# Patient Record
Sex: MALE | Race: WHITE | NOT HISPANIC OR LATINO | Employment: FULL TIME | URBAN - METROPOLITAN AREA
[De-identification: names, ages, dates, MRNs, and addresses within clinical notes are randomized per-mention and may not be internally consistent; named-entity substitution may affect disease eponyms.]

---

## 2017-04-25 ENCOUNTER — HOSPITAL ENCOUNTER (EMERGENCY)
Facility: HOSPITAL | Age: 57
Discharge: HOME | End: 2017-04-25
Attending: EMERGENCY MEDICINE | Admitting: EMERGENCY MEDICINE

## 2017-04-25 VITALS
HEART RATE: 102 BPM | SYSTOLIC BLOOD PRESSURE: 140 MMHG | RESPIRATION RATE: 18 BRPM | OXYGEN SATURATION: 99 % | WEIGHT: 185.2 LBS | TEMPERATURE: 98.3 F | DIASTOLIC BLOOD PRESSURE: 76 MMHG

## 2017-04-25 DIAGNOSIS — IMO0001 ALCOHOLISM /ALCOHOL ABUSE: Primary | ICD-10-CM

## 2017-04-25 PROCEDURE — 99284 EMERGENCY DEPT VISIT MOD MDM: CPT

## 2017-04-25 PROCEDURE — 93005 ELECTROCARDIOGRAM TRACING: CPT

## 2017-04-25 RX ORDER — LORAZEPAM 1 MG/1
1 TABLET ORAL ONCE
Status: COMPLETED | OUTPATIENT
Start: 2017-04-25 | End: 2017-04-25

## 2017-04-25 RX ORDER — LORAZEPAM 1 MG/1
0.5 TABLET ORAL EVERY 6 HOURS PRN
Qty: 15 TABLET | Refills: 0 | Status: SHIPPED | OUTPATIENT
Start: 2017-04-25 | End: 2018-05-07

## 2017-04-25 RX ADMIN — LORAZEPAM 1 MG: 1 TABLET ORAL at 18:23

## 2017-04-26 LAB
ATRIAL RATE: 121 BPM
P AXIS: 47 DEGREES
PR INTERVAL: 136 MS
QRS AXIS: 14 DEGREES
QRSD INTERVAL: 80 MS
QT INTERVAL: 342 MS
QTC INTERVAL: 485 MS
T WAVE AXIS: 86 DEGREES
VENTRICULAR RATE: 121 BPM

## 2018-05-07 ENCOUNTER — APPOINTMENT (EMERGENCY)
Dept: RADIOLOGY | Facility: HOSPITAL | Age: 58
End: 2018-05-07
Payer: COMMERCIAL

## 2018-05-07 ENCOUNTER — HOSPITAL ENCOUNTER (EMERGENCY)
Facility: HOSPITAL | Age: 58
Discharge: HOME | End: 2018-05-07
Attending: EMERGENCY MEDICINE
Payer: COMMERCIAL

## 2018-05-07 VITALS
TEMPERATURE: 98.3 F | RESPIRATION RATE: 17 BRPM | DIASTOLIC BLOOD PRESSURE: 93 MMHG | HEART RATE: 97 BPM | OXYGEN SATURATION: 97 % | SYSTOLIC BLOOD PRESSURE: 122 MMHG

## 2018-05-07 DIAGNOSIS — M25.512 LEFT SHOULDER PAIN: Primary | ICD-10-CM

## 2018-05-07 PROCEDURE — 99283 EMERGENCY DEPT VISIT LOW MDM: CPT

## 2018-05-07 PROCEDURE — 73030 X-RAY EXAM OF SHOULDER: CPT

## 2018-05-07 RX ORDER — NAPROXEN 500 MG/1
500 TABLET ORAL 2 TIMES DAILY WITH MEALS
Qty: 30 TABLET | Refills: 0 | Status: SHIPPED | OUTPATIENT
Start: 2018-05-07 | End: 2018-10-23

## 2018-05-07 NOTE — ED NOTES
Pt may be a poor historian d/t appearing to be intoxicated  Admits to "taking a shot of vodka" prior to arrival and smells of alcohol       Salas Man RN  05/07/18 6808
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
## 2018-05-07 NOTE — DISCHARGE INSTRUCTIONS
Shoulder Pain   WHAT YOU NEED TO KNOW:   Shoulder pain is a common problem and can affect your daily activities  Pain can be caused by a problem within your shoulder  Shoulder pain may also be caused by pain that spreads to your shoulder from another part of your body  DISCHARGE INSTRUCTIONS:   Return to the emergency department if:   · You have severe pain  · You cannot move your arm or shoulder  · You have numbness or tingling in your shoulder or arm  Contact your healthcare provider if:   · Your pain gets worse or does not go away with treatment  · You have trouble moving your arm or shoulder  · You have questions or concerns about your condition or care  Medicines: You may need any of the following:  · Acetaminophen  decreases pain and fever  It is available without a doctor's order  Ask how much to take and how often to take it  Follow directions  Acetaminophen can cause liver damage if not taken correctly  · NSAIDs , such as ibuprofen, help decrease swelling, pain, and fever  This medicine is available with or without a doctor's order  NSAIDs can cause stomach bleeding or kidney problems in certain people  If you take blood thinner medicine, always ask your healthcare provider if NSAIDs are safe for you  Always read the medicine label and follow directions  · Take your medicine as directed  Contact your healthcare provider if you think your medicine is not helping or if you have side effects  Tell him of her if you are allergic to any medicine  Keep a list of the medicines, vitamins, and herbs you take  Include the amounts, and when and why you take them  Bring the list or the pill bottles to follow-up visits  Carry your medicine list with you in case of an emergency  Follow up with your healthcare provider or orthopedist as directed:  Write down your questions so you remember to ask them during your visits     Manage your symptoms:   · Apply ice  on your shoulder for 20 to 30 minutes every 2 hours or as directed  Use an ice pack, or put crushed ice in a plastic bag  Cover it with a towel  Ice helps prevent tissue damage and decreases swelling and pain  · Apply heat if ice does not help your symptoms  Apply heat on your shoulder for 20 to 30 minutes every 2 hours for as many days as directed  Heat helps decrease pain and muscle spasms  · Go to physical or occupational therapy as directed  A physical therapist teaches you exercises to help improve movement and strength, and to decrease pain  An occupational therapist teaches you skills to help with your daily activities  Prevent shoulder pain:   · Stretch and strengthen your shoulder  Use proper technique during exercises and sports  · Limit activities as directed  Try to avoid repeated overhead movements  © 2017 [facility] Information is for End User's use only and may not be sold, redistributed or otherwise used for commercial purposes  All illustrations and images included in CareNotes® are the copyrighted property of A D A M , Inc  or Sheldon Iraheta  The above information is an  only  It is not intended as medical advice for individual conditions or treatments  Talk to your doctor, nurse or pharmacist before following any medical regimen to see if it is safe and effective for you  DISCHARGE INSTRUCTIONS:    FOLLOW UP WITH YOUR PRIMARY CARE PROVIDER OR THE [address] [place] [address]  MAKE AN APPOINTMENT TO BE SEEN  TAKE NAPROXEN AS PRESCRIBED FOR PAIN AND INFLAMMATION  IF RASH, SHORTNESS OF BREATH OR TROUBLE SWALLOWING, STOP TAKING THE MEDICATION AND BE SEEN  REST AND ICE THE AREA  FOLLOW UP WITH THE RECOMMENDED ORTHOPEDIC SPECIALIST  IF SYMPTOMS WORSEN OR NEW SYMPTOMS ARISE, RETURN TO THE ER TO BE SEEN

## 2018-10-22 ENCOUNTER — HOSPITAL ENCOUNTER (EMERGENCY)
Facility: HOSPITAL | Age: 58
Discharge: HOME | End: 2018-10-23
Attending: EMERGENCY MEDICINE | Admitting: EMERGENCY MEDICINE
Payer: COMMERCIAL

## 2018-10-22 ENCOUNTER — APPOINTMENT (EMERGENCY)
Dept: RADIOLOGY | Facility: HOSPITAL | Age: 58
End: 2018-10-22
Payer: COMMERCIAL

## 2018-10-22 DIAGNOSIS — F10.929 ALCOHOL INTOXICATION (HCC): Primary | ICD-10-CM

## 2018-10-22 DIAGNOSIS — R07.9 CHEST PAIN: ICD-10-CM

## 2018-10-22 LAB
ALBUMIN SERPL BCP-MCNC: 4.2 G/DL (ref 3.5–5)
ALP SERPL-CCNC: 98 U/L (ref 46–116)
ALT SERPL W P-5'-P-CCNC: 31 U/L (ref 12–78)
ANION GAP SERPL CALCULATED.3IONS-SCNC: 15 MMOL/L (ref 4–13)
APTT PPP: 30 SECONDS (ref 24–36)
AST SERPL W P-5'-P-CCNC: 26 U/L (ref 5–45)
BASOPHILS # BLD AUTO: 0.03 THOUSANDS/ΜL (ref 0–0.1)
BASOPHILS NFR BLD AUTO: 0 % (ref 0–1)
BILIRUB SERPL-MCNC: 0.36 MG/DL (ref 0.2–1)
BUN SERPL-MCNC: 9 MG/DL (ref 5–25)
CALCIUM SERPL-MCNC: 9 MG/DL (ref 8.3–10.1)
CHLORIDE SERPL-SCNC: 102 MMOL/L (ref 100–108)
CO2 SERPL-SCNC: 24 MMOL/L (ref 21–32)
CREAT SERPL-MCNC: 0.76 MG/DL (ref 0.6–1.3)
EOSINOPHIL # BLD AUTO: 0.03 THOUSAND/ΜL (ref 0–0.61)
EOSINOPHIL NFR BLD AUTO: 0 % (ref 0–6)
ERYTHROCYTE [DISTWIDTH] IN BLOOD BY AUTOMATED COUNT: 15.3 % (ref 11.6–15.1)
ETHANOL EXG-MCNC: 0.1 MG/DL
ETHANOL SERPL-MCNC: 207 MG/DL (ref 0–3)
GFR SERPL CREATININE-BSD FRML MDRD: 101 ML/MIN/1.73SQ M
GLUCOSE SERPL-MCNC: 98 MG/DL (ref 65–140)
HCT VFR BLD AUTO: 38 % (ref 36.5–49.3)
HGB BLD-MCNC: 12.9 G/DL (ref 12–17)
IMM GRANULOCYTES # BLD AUTO: 0.01 THOUSAND/UL (ref 0–0.2)
IMM GRANULOCYTES NFR BLD AUTO: 0 % (ref 0–2)
INR PPP: 1.02 (ref 0.86–1.17)
LIPASE SERPL-CCNC: 131 U/L (ref 73–393)
LYMPHOCYTES # BLD AUTO: 3.05 THOUSANDS/ΜL (ref 0.6–4.47)
LYMPHOCYTES NFR BLD AUTO: 40 % (ref 14–44)
MCH RBC QN AUTO: 29.9 PG (ref 26.8–34.3)
MCHC RBC AUTO-ENTMCNC: 33.9 G/DL (ref 31.4–37.4)
MCV RBC AUTO: 88 FL (ref 82–98)
MONOCYTES # BLD AUTO: 0.63 THOUSAND/ΜL (ref 0.17–1.22)
MONOCYTES NFR BLD AUTO: 8 % (ref 4–12)
NEUTROPHILS # BLD AUTO: 3.85 THOUSANDS/ΜL (ref 1.85–7.62)
NEUTS SEG NFR BLD AUTO: 52 % (ref 43–75)
NRBC BLD AUTO-RTO: 0 /100 WBCS
PLATELET # BLD AUTO: 301 THOUSANDS/UL (ref 149–390)
PMV BLD AUTO: 10 FL (ref 8.9–12.7)
POTASSIUM SERPL-SCNC: 3.8 MMOL/L (ref 3.5–5.3)
PROT SERPL-MCNC: 7.9 G/DL (ref 6.4–8.2)
PROTHROMBIN TIME: 13.5 SECONDS (ref 11.8–14.2)
RBC # BLD AUTO: 4.31 MILLION/UL (ref 3.88–5.62)
SODIUM SERPL-SCNC: 141 MMOL/L (ref 136–145)
TROPONIN I SERPL-MCNC: <0.02 NG/ML
WBC # BLD AUTO: 7.6 THOUSAND/UL (ref 4.31–10.16)

## 2018-10-22 PROCEDURE — 84484 ASSAY OF TROPONIN QUANT: CPT | Performed by: EMERGENCY MEDICINE

## 2018-10-22 PROCEDURE — 80053 COMPREHEN METABOLIC PANEL: CPT | Performed by: EMERGENCY MEDICINE

## 2018-10-22 PROCEDURE — 83690 ASSAY OF LIPASE: CPT | Performed by: EMERGENCY MEDICINE

## 2018-10-22 PROCEDURE — 80320 DRUG SCREEN QUANTALCOHOLS: CPT | Performed by: EMERGENCY MEDICINE

## 2018-10-22 PROCEDURE — 82075 ASSAY OF BREATH ETHANOL: CPT | Performed by: EMERGENCY MEDICINE

## 2018-10-22 PROCEDURE — 99285 EMERGENCY DEPT VISIT HI MDM: CPT

## 2018-10-22 PROCEDURE — 85025 COMPLETE CBC W/AUTO DIFF WBC: CPT | Performed by: EMERGENCY MEDICINE

## 2018-10-22 PROCEDURE — 96365 THER/PROPH/DIAG IV INF INIT: CPT

## 2018-10-22 PROCEDURE — 85730 THROMBOPLASTIN TIME PARTIAL: CPT | Performed by: EMERGENCY MEDICINE

## 2018-10-22 PROCEDURE — 71046 X-RAY EXAM CHEST 2 VIEWS: CPT

## 2018-10-22 PROCEDURE — 93005 ELECTROCARDIOGRAM TRACING: CPT

## 2018-10-22 PROCEDURE — 85610 PROTHROMBIN TIME: CPT | Performed by: EMERGENCY MEDICINE

## 2018-10-22 PROCEDURE — 36415 COLL VENOUS BLD VENIPUNCTURE: CPT | Performed by: EMERGENCY MEDICINE

## 2018-10-22 PROCEDURE — 96375 TX/PRO/DX INJ NEW DRUG ADDON: CPT

## 2018-10-22 RX ORDER — ONDANSETRON 2 MG/ML
4 INJECTION INTRAMUSCULAR; INTRAVENOUS ONCE
Status: COMPLETED | OUTPATIENT
Start: 2018-10-22 | End: 2018-10-22

## 2018-10-22 RX ORDER — ASPIRIN 81 MG/1
324 TABLET, CHEWABLE ORAL ONCE
Status: COMPLETED | OUTPATIENT
Start: 2018-10-22 | End: 2018-10-22

## 2018-10-22 RX ADMIN — SODIUM CHLORIDE 1000 ML: 0.9 INJECTION, SOLUTION INTRAVENOUS at 21:24

## 2018-10-22 RX ADMIN — FOLIC ACID 1 MG: 5 INJECTION, SOLUTION INTRAMUSCULAR; INTRAVENOUS; SUBCUTANEOUS at 21:24

## 2018-10-22 RX ADMIN — ONDANSETRON 4 MG: 2 INJECTION INTRAMUSCULAR; INTRAVENOUS at 21:36

## 2018-10-22 RX ADMIN — ASPIRIN 81 MG 324 MG: 81 TABLET ORAL at 23:33

## 2018-10-23 VITALS
RESPIRATION RATE: 16 BRPM | TEMPERATURE: 99.1 F | HEART RATE: 85 BPM | DIASTOLIC BLOOD PRESSURE: 77 MMHG | SYSTOLIC BLOOD PRESSURE: 136 MMHG | WEIGHT: 160 LBS | OXYGEN SATURATION: 100 %

## 2018-10-23 LAB
ATRIAL RATE: 92 BPM
ATRIAL RATE: 94 BPM
P AXIS: 58 DEGREES
P AXIS: 59 DEGREES
PR INTERVAL: 156 MS
PR INTERVAL: 162 MS
QRS AXIS: 23 DEGREES
QRS AXIS: 31 DEGREES
QRSD INTERVAL: 82 MS
QRSD INTERVAL: 84 MS
QT INTERVAL: 334 MS
QT INTERVAL: 336 MS
QTC INTERVAL: 413 MS
QTC INTERVAL: 420 MS
T WAVE AXIS: 39 DEGREES
T WAVE AXIS: 45 DEGREES
TROPONIN I SERPL-MCNC: <0.02 NG/ML
VENTRICULAR RATE: 92 BPM
VENTRICULAR RATE: 94 BPM

## 2018-10-23 PROCEDURE — 93010 ELECTROCARDIOGRAM REPORT: CPT | Performed by: INTERNAL MEDICINE

## 2018-10-23 NOTE — ED NOTES
Water, apple juice and minh crackers given to patient   Waiting for HOST to come pt aware and verbalizes understanding       David Post RN  10/23/18 0644

## 2018-10-23 NOTE — DISCHARGE INSTRUCTIONS
Follow up with HOST as you need to  If you feel you cannot stay sober so can return to the ER  Return to the ER if your chest pain changes in quality or location, or becomes associated with shortness of breath, lightheadedness, vomiting or sweating  Alcohol Intoxication   WHAT YOU NEED TO KNOW:   Alcohol intoxication is a harmful physical condition caused when you drink more alcohol than your body can handle  It is also called ethanol poisoning, or being drunk  DISCHARGE INSTRUCTIONS:   Medicine: You may be given medicine to manage the signs and symptoms of alcohol intoxication  Take your medicine as directed  Contact your healthcare provider if you think your medicine is not helping or if you have side effects  Tell him if you are allergic to any medicine  Keep a list of the medicines, vitamins, and herbs you take  Include the amounts, and when and why you take them  Bring the list or the pill bottles to follow-up visits  Keep the list with you in case of emergency  Follow up with your healthcare provider as directed:  Write down your questions so you remember to ask them during your visits  Limit or avoid alcohol:  Men should not have more than 2 drinks per day  Women should not have more than 1 drink per day  A drink is 12 ounces of beer, 5 ounces of wine, or 1½ ounces of liquor  Do not drive or operate machines when you drink alcohol:  Make sure you always have someone to drive you when you drink alcohol  For more information:   · Alcoholics Anonymous  Web Address: http://www mosquera info/  Contact your healthcare provider if:   · You need help to stop drinking alcohol  · You have trouble with work or school because you drink too much alcohol  · You have physical or verbal fights because of alcohol  · You have questions or concerns about your condition or care  Return to the emergency department if:   · You have sudden trouble breathing or chest pain  · You have a seizure      · You feel sad enough to harm yourself or others  · You have hallucinations (you see or hear things that are not real)  · You cannot stop vomiting  · You were in an accident because of alcohol  © 2017 [address] Information is for End User's use only and may not be sold, redistributed or otherwise used for commercial purposes  All illustrations and images included in CareNotes® are the copyrighted property of A D A M , Inc  or Sheldon Iraheta  The above information is an  only  It is not intended as medical advice for individual conditions or treatments  Talk to your doctor, nurse or pharmacist before following any medical regimen to see if it is safe and effective for you

## 2018-10-23 NOTE — ED ATTENDING ATTESTATION
Nohemy Ansari MD, saw and evaluated the patient  I have discussed the patient with the resident/non-physician practitioner and agree with the resident's/non-physician practitioner's findings, Plan of Care, and MDM as documented in the resident's/non-physician practitioner's note, except where noted  All available labs and Radiology studies were reviewed  At this point I agree with the current assessment done in the Emergency Department  I have conducted an independent evaluation of this patient a history and physical is as follows:  54-year-old male presents for evaluation of alcohol intoxication  He states he drinks a 5th of vodka daily and has been drinking heavily for the past several days  He states that earlier today he felt weird in his chest, head, jittery all over  He states the symptoms have resolved since being here and getting IV fluids  He denies headache, history of withdrawal seizure, shortness of breath, cough UR symptoms abdominal pain lower extremity edema calf pain, suicidal ideation risk factors for DVT or pulmonary embolism  Ten systems reviewed otherwise negative    On exam acute distress, lungs normal cardiac normal, abdomen normal, psych normal   Medical decision making;-alcohol intoxication in multiple complaints-will check labs including Del to EKG/troponin to rule out atypical chest pain, reassess for disposition  Critical Care Time  CritCare Time    Procedures

## 2019-11-19 ENCOUNTER — HOSPITAL ENCOUNTER (EMERGENCY)
Facility: HOSPITAL | Age: 59
Discharge: HOME | End: 2019-11-19
Attending: EMERGENCY MEDICINE | Admitting: EMERGENCY MEDICINE
Payer: COMMERCIAL

## 2019-11-19 VITALS
SYSTOLIC BLOOD PRESSURE: 175 MMHG | DIASTOLIC BLOOD PRESSURE: 85 MMHG | HEART RATE: 98 BPM | OXYGEN SATURATION: 98 % | RESPIRATION RATE: 18 BRPM | WEIGHT: 191.14 LBS | TEMPERATURE: 98.3 F

## 2019-11-19 DIAGNOSIS — Z76.89 RETURN TO WORK EVALUATION: Primary | ICD-10-CM

## 2019-11-19 PROCEDURE — 99283 EMERGENCY DEPT VISIT LOW MDM: CPT

## 2019-11-19 PROCEDURE — 99282 EMERGENCY DEPT VISIT SF MDM: CPT | Performed by: PHYSICIAN ASSISTANT

## 2019-11-20 NOTE — ED PROVIDER NOTES
History  Chief Complaint   Patient presents with    Back Pain     low back pain since he was in his 35s, reports "sometimes it hurts and sometimes it doesnt", pt reports that he needs a note so he can go back to work     [place] for School/Work     Patient is a 42-year-old male with no significant past medical history presents for return to work evaluation  He states that since he was 22years old he will occasionally get some back pain/muscle spasms  He states that he has never been evaluated for it but states that it resolved after rest   States that it happened about 3 or 4 days ago  He states he was moving some furniture and felt some back pain on the left lower side  He states he rested did some stretches and took a warm bath and the pain resolved like it always does  He states that he had to miss work yesterday at his new job  He states he needed a note to go back  Denies any fall or trauma  He denies any radiation of pain  He has no pain currently  He denies fever, chills, nausea vomiting diarrhea, chest pain, shortness breath, abdominal pain, saddle anesthesia, bladder or bowel dysfunction, dysuria, hematuria, numbness or weakness, rash, recent illnesses, history of cancer, history of IV drug use  None       Past Medical History:   Diagnosis Date    Alcoholism /alcohol abuse [facility])        History reviewed  No pertinent surgical history  History reviewed  No pertinent family history  I have reviewed and agree with the history as documented  Social History     Tobacco Use    Smoking status: Current Every Day Smoker     Types: Cigars    Smokeless tobacco: Never Used   Substance Use Topics    Alcohol use: Yes     Comment: 5 x 5th Smirnoff    Drug use: Yes     Types: Marijuana        Review of Systems   Constitutional: Negative for chills and fever  Respiratory: Negative for shortness of breath  Cardiovascular: Negative for chest pain     Gastrointestinal: Negative for abdominal pain, diarrhea, nausea and vomiting  Genitourinary: Negative for decreased urine volume, difficulty urinating, dysuria, frequency and hematuria  Musculoskeletal: Positive for back pain  Skin: Negative for rash  Neurological: Negative for weakness and numbness  All other systems reviewed and are negative  Physical Exam  Physical Exam   Constitutional: He appears well-developed and well-nourished  No distress  HENT:   Head: Normocephalic and atraumatic  Right Ear: External ear normal    Left Ear: External ear normal    Nose: Nose normal    Eyes: Conjunctivae and EOM are normal    Neck: Normal range of motion  Cardiovascular: Normal rate, regular rhythm and normal heart sounds  Exam reveals no gallop and no friction rub  No murmur heard  Pulmonary/Chest: Effort normal and breath sounds normal  No stridor  No respiratory distress  He has no wheezes  Abdominal: Soft  Bowel sounds are normal  He exhibits no distension  There is no tenderness  There is no guarding  Musculoskeletal: Normal range of motion  No tenderness palpation over the midline spinous process tenderness of the cervical, thoracic or lumbar spine  No paravertebral muscle or low back muscle tenderness palpation  No rash, erythema, ecchymosis, abrasion, warmth or swelling noted  No muscle spasms palpated  Full range of motion of the upper and lower extremities  Neurovascularly intact distally  Pedal pulses intact  Great toe extension strength intact  No foot drop  Ambulates without difficulty  Moved from lying to sitting to standing without difficulty  Neurological: He is alert  Skin: Skin is warm and dry  Capillary refill takes less than 2 seconds  No rash noted  He is not diaphoretic  No erythema  Psychiatric: He has a normal mood and affect  His behavior is normal    Nursing note and vitals reviewed        Vital Signs  ED Triage Vitals   Temperature Pulse Respirations Blood Pressure SpO2   11/19/19 1901 11/19/19 1901 11/19/19 1900 11/19/19 1902 11/19/19 1900   98 3 °F (36 8 °C) 98 18 (!) 181/94 98 %      Temp src Heart Rate Source Patient Position - Orthostatic VS BP Location FiO2 (%)   -- 11/19/19 2011 11/19/19 2011 11/19/19 2011 --    Monitor Lying Right arm       Pain Score       11/19/19 1900       1           Vitals:    11/19/19 1901 11/19/19 1902 11/19/19 2011   BP:  (!) 181/94 (!) 175/85   Pulse: 98     Patient Position - Orthostatic VS:   Lying         Visual Acuity      ED Medications  Medications - No data to display    Diagnostic Studies  Results Reviewed     None                 No orders to display              Procedures  Procedures       ED Course                               MDM  Number of Diagnoses or Management Options  Return to work evaluation:   Diagnosis management comments: Patient with history of intermittent back pain since age 22  No fall or trauma  States he just needs a work note to go back to work on Friday as scheduled  Patient to return to work as scheduled  Follow up with family doctor  Return to the ED if symptoms worsen or new symptoms arise  Patient states understanding and agrees with plan  Patient Progress  Patient progress: stable      Disposition  Final diagnoses:   Return to work evaluation     Time reflects when diagnosis was documented in both MDM as applicable and the Disposition within this note     Time User Action Codes Description Comment    11/19/2019  8:06 PM Winter Knight Add [Z76 89] Return to work evaluation       ED Disposition     ED Disposition Condition Date/Time Comment    Discharge Stable Tue Nov 19, 2019  8:06  [address] discharge to home/self care              Follow-up Information     Follow up With Specialties Details Why Contact Info Additional [address] [facility] Schedule an appointment as soon as possible for a visit in 1 day  [address] [place] [address]  [id number]  [address] [place] [address],[address] [facility]  [facility], [place], [place], [address] [address] [facility] Emergency Medicine  If symptoms worsen Abhishek [id number] [phone] [place] [facility], [address] [facility]  , [place], [place], [address]          There are no discharge medications for this patient  No discharge procedures on file      ED Provider  Electronically Signed by           Linda Dillard PA-C  11/19/19 0984

## 2021-05-18 ENCOUNTER — HOSPITAL ENCOUNTER (EMERGENCY)
Facility: HOSPITAL | Age: 61
Discharge: HOME | End: 2021-05-18
Attending: EMERGENCY MEDICINE | Admitting: EMERGENCY MEDICINE

## 2021-05-18 VITALS — OXYGEN SATURATION: 98 % | TEMPERATURE: 98.2 F | HEART RATE: 85 BPM | RESPIRATION RATE: 18 BRPM

## 2021-05-18 DIAGNOSIS — R45.1 AGITATION: ICD-10-CM

## 2021-05-18 DIAGNOSIS — S00.81XA ABRASION OF FACE, INITIAL ENCOUNTER: ICD-10-CM

## 2021-05-18 DIAGNOSIS — F10.929 ALCOHOL INTOXICATION (HCC): Primary | ICD-10-CM

## 2021-05-18 DIAGNOSIS — R46.89 AGGRESSIVE BEHAVIOR: ICD-10-CM

## 2021-05-18 PROCEDURE — 99282 EMERGENCY DEPT VISIT SF MDM: CPT | Performed by: EMERGENCY MEDICINE

## 2021-05-18 PROCEDURE — 90715 TDAP VACCINE 7 YRS/> IM: CPT | Performed by: EMERGENCY MEDICINE

## 2021-05-18 PROCEDURE — 90471 IMMUNIZATION ADMIN: CPT

## 2021-05-18 PROCEDURE — 99284 EMERGENCY DEPT VISIT MOD MDM: CPT

## 2021-05-18 RX ORDER — LORAZEPAM 2 MG/ML
2 INJECTION INTRAMUSCULAR ONCE
Status: DISCONTINUED | OUTPATIENT
Start: 2021-05-18 | End: 2021-05-18

## 2021-05-18 RX ADMIN — TETANUS TOXOID, REDUCED DIPHTHERIA TOXOID AND ACELLULAR PERTUSSIS VACCINE, ADSORBED 0.5 ML: 5; 2.5; 8; 8; 2.5 SUSPENSION INTRAMUSCULAR at 17:39
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
## 2021-06-10 NOTE — ED PROVIDER NOTES
CARDIOLOGY PROGRESS NOTE       HISTORY     Sharda Dixon is a 67 year old female who has a history of palpitations and Holter monitor revealing atrial tachycardia presents for a follow-up. Since her last visit on 09/06/2018 the patient states that she has been feeling well. She states that she has been trying to lose some weight. She was on phentermine. Dr. Saavedra's office is requesting cardiac clearance to restart the patient on phentermine 15 mg once daily for weight loss. She was noted to have EKG changes in May with new T-wave inversions in the inferior leads. The patient denies any chest pains, palpitations, or shortness of breath. The patient denies any lightheadedness or dizziness. There is no orthopnea or PND. No other complaints at this time.    Holter monitor 01/21/2017:  INDICATION FOR STUDY.  Irregular heartbeat.  The patient underwent a 24-hour Holter monitor.  She was noted to be in a normal sinus rhythm with a minimum heart rate of 65 beats per minute, average heart rate of 87 beats per minute, maximum heart rate of 119 beats per minute.  There were 102 isolated PVCs with 2 ventricular couplets that were noted.  She had 1 run of an atrial tachycardia at a rate of about 156 beats per minute.  She had another run of an atrial tachycardia lasting about 20 beats per minute.  The patient had another 5 beat run of an atrial tachycardia.  No symptoms were recorded.  The longest RR interval was 1.2 seconds.  CONCLUSION:  The patient has some short bursts of an atrial tachycardia, but is otherwise an unremarkable Holter monitor.    Echocardiogram 01/11/2017:  Normal left ventricular size and systolic function, EF 57 %.  Trace-to-mild tricuspid valve regurgitation.    MEDICAL HISTORY     Past Medical History:   Diagnosis Date   • Active gastrointestinal ulcer    • Anemia    • Back pain, chronic    • Chronic back pain    • Chronic pain    • Esophageal reflux    • Essential hypertension 8/31/2018  History  Chief Complaint   Patient presents with    Alcohol Intoxication     Drinking x 3 days vodka, 1/5 per day, now dizzy  Hx of etoh abuse  Denies SI     Patient is a 51-year-old male with a past medical history significant for alcohol abuse presents with lightheadedness and chest pain  Patient reports that he typically goes on binges after getting his paycheck which he spends on alcohol, marijuana, cigars  Over the last 4 days he has been drinking excessively including today at which time he drank a 5th of vodka  After drinking the 5th of vodka he developed lightheadedness and chest pain that he describes as substernal, nonradiating, constant, associated with some lightheadedness and nausea  Denies palpitations, dizziness, shortness of breath, vomiting, diarrhea, abdominal pain, neck pain, back pain  None       Past Medical History:   Diagnosis Date    Alcoholism /alcohol abuse [facility])        History reviewed  No pertinent surgical history  History reviewed  No pertinent family history  I have reviewed and agree with the history as documented  Social History   Substance Use Topics    Smoking status: Current Every Day Smoker     Types: Cigars    Smokeless tobacco: Never Used    Alcohol use Yes      Comment: 5 x 5th Smirnoff        Review of Systems   Constitutional: Negative for chills and fever  HENT: Negative for congestion and rhinorrhea  Eyes: Negative for photophobia and visual disturbance  Respiratory: Negative for chest tightness and shortness of breath  Cardiovascular: Positive for chest pain  Negative for palpitations and leg swelling  Gastrointestinal: Positive for nausea  Negative for abdominal pain, blood in stool, constipation, diarrhea and vomiting  Genitourinary: Negative for dysuria, flank pain and hematuria  Musculoskeletal: Negative for back pain and neck pain  Skin: Negative for pallor and rash  Neurological: Positive for light-headedness   Negative   • GERD (gastroesophageal reflux disease)    • Pain management contract signed 6/15/2015    Opioid management contract signed with Dr. Nichols.    • Pain management contract signed 2019   • Postprandial epigastric pain 2018    \"Vegetarian, but not tolerating many foods currently\"       SURGICAL HISTORY     Past Surgical History:   Procedure Laterality Date   • Bunionectomy Left     Foot    • Esophagogastroduodenoscopy  2018   • Radiofrequency ablation Bilateral 2014    L4-5, L5-S1    • Radiofrequency ablation Bilateral 2016    L4-5, L5-S1    • Radiofrequency ablation Bilateral 10/13/2016    L4-5, L5-S1    • Radiofrequency ablation Bilateral 2017    L4-5, L5-S1   • Removal gallbladder  02/10/2014    Lap Cholecystectomy       SOCIAL HISTORY     Social History     Tobacco Use   • Smoking status: Former Smoker     Packs/day: 0.25     Years: 5.00     Pack years: 1.25     Quit date: 10/9/1975     Years since quittin.7   • Smokeless tobacco: Never Used   Substance Use Topics   • Alcohol use: No     Alcohol/week: 0.0 standard drinks   • Drug use: No       FAMILY HISTORY     Family History   Problem Relation Age of Onset   • Diabetes Mother    • Cancer Father        MEDICATIONS     Current Outpatient Medications   Medication Sig   • DIAZepam (VALIUM) 10 MG tablet Take 1 tablet by mouth nightly as needed for Sleep or Muscle spasms.   • Multiple Vitamin (MULTIVITAMIN ADULT PO)    • Cholecalciferol (VITAMIN D3) 1.25 mg (50,000 units) capsule Take 1 capsule by mouth 1 day a week. For 6 weeks   • gabapentin (NEURONTIN) 300 MG capsule Takes 600 mg oral nightly and 300 mg oral daily   • HYDROcodone-acetaminophen (NORCO) 5-325 MG per tablet Take 1-2 tablets by mouth every 6 hours as needed (As needed for postprocedural pain).   • UNABLE TO FIND 6/15/15 medication contract signed with Dr. Nichols     No current facility-administered medications for this visit.       ALLERGIES   ALLERGIES:  No  for dizziness, weakness, numbness and headaches  Physical Exam  ED Triage Vitals   Temperature Pulse Respirations Blood Pressure SpO2   10/22/18 1843 10/22/18 1843 10/22/18 1843 10/22/18 1843 10/22/18 1843   99 1 °F (37 3 °C) (!) 120 20 122/89 99 %      Temp Source Heart Rate Source Patient Position - Orthostatic VS BP Location FiO2 (%)   10/22/18 1843 10/22/18 2125 10/22/18 2125 10/22/18 2125 --   Oral Monitor Sitting Left arm       Pain Score       10/22/18 1843       No Pain           Orthostatic Vital Signs  Vitals:    10/22/18 2125 10/22/18 2238 10/22/18 2335 10/23/18 0614   BP: 129/84 124/86 131/85 136/77   Pulse: 94 98 97 85   Patient Position - Orthostatic VS: Sitting Lying         Physical Exam   Constitutional: He is oriented to person, place, and time  He appears well-developed and well-nourished  No distress  HENT:   Head: Normocephalic and atraumatic  Right Ear: External ear normal    Left Ear: External ear normal    Nose: Nose normal    Mouth/Throat: Oropharynx is clear and moist  No oropharyngeal exudate  Eyes: Pupils are equal, round, and reactive to light  Conjunctivae and EOM are normal    Neck: Normal range of motion  Neck supple  Cardiovascular: Regular rhythm, normal heart sounds and intact distal pulses  Exam reveals no gallop and no friction rub  No murmur heard  tachycardic   Pulmonary/Chest: Effort normal and breath sounds normal  No respiratory distress  He has no wheezes  He has no rales  He exhibits no tenderness  Abdominal: Soft  Bowel sounds are normal  He exhibits no distension  There is no tenderness  There is no rebound and no guarding  Musculoskeletal: Normal range of motion  He exhibits no edema or tenderness  Neurological: He is alert and oriented to person, place, and time  GCS eye subscore is 4  GCS verbal subscore is 5  GCS motor subscore is 6  Moves all 4 extremities    Skin: Skin is warm and dry  Capillary refill takes less than 2 seconds   He is not Active Allergies      PHYSICAL EXAM     Vitals:   Visit Vitals  BP (!) 158/94   Pulse 96   Resp 16   Ht 5' 3\" (1.6 m)   Wt 68 kg   BMI 26.56 kg/m²   Weight: Refused.     General: NAD, pleasant, alert and oriented x 3.  Neck: No carotid bruits, no JVD (jugular venous distension). Normal carotid upstroke.  Pulmonary: No retractions or increased work of breathing, clear to auscultation bilaterally. No crackles or wheezing.  Cardiovascular: PMI (point of maximal impulse) in 5th intercostal place. RRR, normal S1 S2, no S3 or S4 appreciated. There are no murmurs.  Abdomen:  Bowel sounds normoactive, soft, non-tender, non-distended, no hepatosplenomegaly.  Extremities:  No edema or cyanosis.        Glucose (mg/dL)   Date Value   11/10/2020 116 (H)      CHOLESTEROL (mg/dL)   Date Value   01/28/2020 229 (H)     HDL (mg/dL)   Date Value   01/28/2020 43 (L)     CALCULATED LDL (mg/dL)   Date Value   01/28/2020 157 (H)     TRIGLYCERIDE (mg/dL)   Date Value   01/28/2020 147      Lab Results   Component Value Date    GPT 38 01/28/2020      EKG 05/27/2021:        ASSESSMENT     1.  Sinus tachycardia.  2.  Gastroesophageal reflux  3.  Palpitations, resolved.  4.  Hypertension, chronically elevated secondary to pain per patient   5.  Abnormal ECG, new inferolateral T-wave inversions 05/27/2021    SHAY Dixon is a 67 year old female who has a history of palpitations, GERD, sinus tachycardia, and hypertension who is not having any complaints of chest pain or shortness of breath. The patient has been trying to lose weight recently and had been restarted on phentermine. This was discontinued due to ECG changes on 05/27/2021 with new T-wave inversions inferolaterally. I discussed with the patient my recommendation for a stress test. The patient wants to hold off until we check some lab work, she believes she may be anemic. The patient previously stated that she does not like taking any medications. For additional  diaphoretic  No erythema  Psychiatric: He has a normal mood and affect  His behavior is normal  He expresses no homicidal and no suicidal ideation  He expresses no suicidal plans and no homicidal plans  Nursing note and vitals reviewed  ED Medications  Medications   sodium chloride 0 9 % bolus 1,000 mL (0 mL Intravenous Stopped 10/22/18 2226)   ondansetron (ZOFRAN) injection 4 mg (4 mg Intravenous Given 80/59/90 7351)   folic acid 1 mg, thiamine (VITAMIN B1) 100 mg in sodium chloride 0 9 % 50 mL IVPB (0 mg Intravenous Stopped 10/22/18 2226)   aspirin chewable tablet 324 mg (324 mg Oral Given 10/22/18 2333)       Diagnostic Studies  Results Reviewed     Procedure Component Value Units Date/Time    Troponin I [[id number]]  (Normal) Collected:  10/22/18 2344    Lab Status:  Final result Specimen:  Blood from Arm, Right Updated:  10/23/18 0008     Troponin I <0 02 ng/mL     POCT alcohol breath test [[id number]]  (Normal) Resulted:  10/22/18 2351    Lab Status:  Final result Updated:  10/22/18 2351     EXTBreath Alcohol 0 098    Comprehensive metabolic panel [[id number]]  (Abnormal) Collected:  10/22/18 2051    Lab Status:  Final result Specimen:  Blood from Arm, Right Updated:  10/22/18 2134     Sodium 141 mmol/L      Potassium 3 8 mmol/L      Chloride 102 mmol/L      CO2 24 mmol/L      ANION GAP 15 (H) mmol/L      BUN 9 mg/dL      Creatinine 0 76 mg/dL      Glucose 98 mg/dL      Calcium 9 0 mg/dL      AST 26 U/L      ALT 31 U/L      Alkaline Phosphatase 98 U/L      Total Protein 7 9 g/dL      Albumin 4 2 g/dL      Total Bilirubin 0 36 mg/dL      eGFR 101 ml/min/1 73sq m     Narrative:         National Kidney Disease Education Program recommendations are as follows:  GFR calculation is accurate only with a steady state creatinine  Chronic Kidney disease less than 60 ml/min/1 73 sq  meters  Kidney failure less than 15 ml/min/1 73 sq  meters      Lipase [[id number]]  (Normal) Collected:  10/22/18 2051    Lab Status: Final result Specimen:  Blood from Arm, Right Updated:  10/22/18 2134     Lipase 131 u/L     Troponin I [[id number]]  (Normal) Collected:  10/22/18 2051    Lab Status:  Final result Specimen:  Blood from Arm, Right Updated:  10/22/18 2126     Troponin I <0 02 ng/mL     Ethanol [[id number]]  (Abnormal) Collected:  10/22/18 2051    Lab Status:  Final result Specimen:  Blood from Arm, Right Updated:  10/22/18 2118     Ethanol Lvl 207 (H) mg/dL     Protime-INR [[id number]]  (Normal) Collected:  10/22/18 2051    Lab Status:  Final result Specimen:  Blood from Arm, Right Updated:  10/22/18 2110     Protime 13 5 seconds      INR 1 02    APTT [[id number]]  (Normal) Collected:  10/22/18 2051    Lab Status:  Final result Specimen:  Blood from Arm, Right Updated:  10/22/18 2110     PTT 30 seconds     CBC and differential [[id number]]  (Abnormal) Collected:  10/22/18 2051    Lab Status:  Final result Specimen:  Blood from Arm, Right Updated:  10/22/18 2102     WBC 7 60 Thousand/uL      RBC 4 31 Million/uL      Hemoglobin 12 9 g/dL      Hematocrit 38 0 %      MCV 88 fL      MCH 29 9 pg      MCHC 33 9 g/dL      RDW 15 3 (H) %      MPV 10 0 fL      Platelets 577 Thousands/uL      nRBC 0 /100 WBCs      Neutrophils Relative 52 %      Immat GRANS % 0 %      Lymphocytes Relative 40 %      Monocytes Relative 8 %      Eosinophils Relative 0 %      Basophils Relative 0 %      Neutrophils Absolute 3 85 Thousands/µL      Immature Grans Absolute 0 01 Thousand/uL      Lymphocytes Absolute 3 05 Thousands/µL      Monocytes Absolute 0 63 Thousand/µL      Eosinophils Absolute 0 03 Thousand/µL      Basophils Absolute 0 03 Thousands/µL                  XR chest 2 views   ED Interpretation by Alka Hadley DO (10/22 2119)   No acute abnormalities as interpreted by me independently      Final Result by Miracle Boone MD (10/23 0825)      No acute cardiopulmonary disease              Workstation performed: [id number]               Procedures  ECG 12 Lead risk stratification, I recommended that the patient undergo CT cardiac calcium scoring. If this reveals coronary plaquing then the patient's cholesterol will need to be more aggressively managed with a goal LDL of ~70. If she is having coronary plaquing a stress test will be further indicated. Will contact the patient with the results of this and will make further recommendations at that time. The patient states that she does not want to take any statins. The patient should not restart the phentermine before we stress test her. Holter monitor 01/2017 revealed short bursts of sinus tachycardia. Echocardiogram 01/2017 revealed normal heart function, EF 57%. Her blood pressure has been trending hypertensive, the patient believes that this is secondary to her chronic pain, she does not want any medications adjustments for her blood pressure. I will follow-up with her in 9 months. She will contact me sooner for any concerns. Thank you for allowing me to participate in the care of this patient.    On 6/10/2021, I Renato Parker, personally transcribed this document on behalf of Dr. Antonio Lucero MD.    The documentation recorded by the scribe accurately and completely reflects the service(s) I personally performed and the decisions made by me.          Sincerely,    Antonio Lucero M.D.-Ph.D. [facility]       Documentation  Date/Time: 10/22/2018 11:12 PM  Performed by: [email or web address] by: Abbey Aguirre     Indications / Diagnosis:  Cp  ECG reviewed by me, the ED Provider: yes    Patient location:  ED  Previous ECG:     Previous ECG:  Compared to current    Comparison ECG info:  04/25/2017    Similarity:  No change  Interpretation:     Interpretation: normal    Rate:     ECG rate:  92    ECG rate assessment: normal    Rhythm:     Rhythm: sinus rhythm    Ectopy:     Ectopy: none    QRS:     QRS axis:  Normal  Conduction:     Conduction: normal    ST segments:     ST segments:  Normal  T waves:     T waves: normal      ECG 12 Lead Documentation  Date/Time: 10/22/2018 11:46 PM  Performed by: [email or web address] by: Abbey Aguirre     Indications / Diagnosis:  Delta ekg  ECG reviewed by me, the ED Provider: yes    Patient location:  ED  Previous ECG:     Previous ECG:  Compared to current    Comparison ECG info:  3 hours prior    Similarity:  No change  Interpretation:     Interpretation: normal    Rate:     ECG rate:  94    ECG rate assessment: normal    Rhythm:     Rhythm: sinus rhythm    Ectopy:     Ectopy: none    QRS:     QRS axis:  Normal  Conduction:     Conduction: normal    ST segments:     ST segments:  Normal  T waves:     T waves: normal            Phone Consults  ED Phone Contact    ED Course  ED Course as of Oct 23 1400   Mon Oct 22, 2018   2140 MEDICAL ALCOHOL: (!) 207   2240 Delta troponin, EKG at midnight          HEART Risk Score      Most Recent Value   History  0 Filed at: 10/22/2018 2326   ECG  0 Filed at: 10/22/2018 2326   Age  1 Filed at: 10/22/2018 2326   Risk Factors  0 Filed at: 10/22/2018 2326   Troponin  0 Filed at: 10/22/2018 2326   Heart Score Risk Calculator   History  0 Filed at: 10/22/2018 2326   ECG  0 Filed at: 10/22/2018 2326   Age  1 Filed at: 10/22/2018 2326   Risk Factors  0 Filed at: 10/22/2018 2326   Troponin  0 Filed at: 10/22/2018 2326   HEART Score  1 Filed at: 10/22/2018 2326   HEART Score  1 Filed at: 10/22/2018 2326                            [facility]  Number of Diagnoses or Management Options  Alcohol intoxication [facility]):   Chest pain:   Diagnosis management comments: Assessment and plan:  Alcohol intoxication  Chest pain and lightheadedness  Will get a cardiac workup  Patient is intoxicated with an alcohol level of 207  Will need to wait until less than 100 and will require a delta troponin and EKG  Will get crisis to speak with patient once he is no longer intoxicated    CritCare Time    Disposition  Final diagnoses:   Alcohol intoxication ([facility] [address] )   Chest pain     Time reflects when diagnosis was documented in both MDM as applicable and the Disposition within this note     Time User Action Codes Description Comment    10/22/2018 11:14 PM [facility] Robert Add [F10 929] Alcohol intoxication ([facility] [address] )     10/22/2018 11:14 PM Army Jones Add [R07 9] Chest pain       ED Disposition     ED Disposition Condition Comment    Discharge  [facility] discharge to home/self care  Condition at discharge: Stable        Follow-up Information    None         There are no discharge medications for this patient  No discharge procedures on file  ED Provider  Attending physically available and evaluated [address]  I managed the patient along with the ED Attending      Electronically Signed by         Jolynn Edward DO  10/23/18 0498

## 2023-07-05 ENCOUNTER — APPOINTMENT (EMERGENCY)
Dept: CT IMAGING | Facility: HOSPITAL | Age: 63
End: 2023-07-05
Payer: COMMERCIAL

## 2023-07-05 ENCOUNTER — HOSPITAL ENCOUNTER (EMERGENCY)
Facility: HOSPITAL | Age: 63
Discharge: HOME | End: 2023-07-05
Attending: EMERGENCY MEDICINE
Payer: COMMERCIAL

## 2023-07-05 VITALS
DIASTOLIC BLOOD PRESSURE: 90 MMHG | SYSTOLIC BLOOD PRESSURE: 156 MMHG | OXYGEN SATURATION: 99 % | RESPIRATION RATE: 18 BRPM | HEART RATE: 105 BPM | WEIGHT: 189.6 LBS | TEMPERATURE: 98.6 F

## 2023-07-05 DIAGNOSIS — E83.42 HYPOMAGNESEMIA: ICD-10-CM

## 2023-07-05 DIAGNOSIS — K80.20 CHOLELITHIASIS: ICD-10-CM

## 2023-07-05 DIAGNOSIS — R07.89 ATYPICAL CHEST PAIN: Primary | ICD-10-CM

## 2023-07-05 DIAGNOSIS — K76.0 HEPATIC STEATOSIS: ICD-10-CM

## 2023-07-05 DIAGNOSIS — F10.10 CHRONIC ALCOHOL ABUSE: ICD-10-CM

## 2023-07-05 LAB
2HR DELTA HS TROPONIN: -1 NG/L
ALBUMIN SERPL BCP-MCNC: 4 G/DL (ref 3.5–5)
ALP SERPL-CCNC: 105 U/L (ref 34–104)
ALT SERPL W P-5'-P-CCNC: 24 U/L (ref 7–52)
ANION GAP SERPL CALCULATED.3IONS-SCNC: 14 MMOL/L
AST SERPL W P-5'-P-CCNC: 32 U/L (ref 13–39)
ATRIAL RATE: 110 BPM
ATRIAL RATE: 125 BPM
BASOPHILS # BLD AUTO: 0.04 THOUSANDS/ÂΜL (ref 0–0.1)
BASOPHILS NFR BLD AUTO: 1 % (ref 0–1)
BILIRUB SERPL-MCNC: 0.55 MG/DL (ref 0.2–1)
BUN SERPL-MCNC: 10 MG/DL (ref 5–25)
CALCIUM SERPL-MCNC: 8.6 MG/DL (ref 8.4–10.2)
CARDIAC TROPONIN I PNL SERPL HS: 5 NG/L
CARDIAC TROPONIN I PNL SERPL HS: 6 NG/L
CHLORIDE SERPL-SCNC: 102 MMOL/L (ref 96–108)
CO2 SERPL-SCNC: 20 MMOL/L (ref 21–32)
CREAT SERPL-MCNC: 0.79 MG/DL (ref 0.6–1.3)
D DIMER PPP FEU-MCNC: 0.83 UG/ML FEU
EOSINOPHIL # BLD AUTO: 0.02 THOUSAND/ÂΜL (ref 0–0.61)
EOSINOPHIL NFR BLD AUTO: 0 % (ref 0–6)
ERYTHROCYTE [DISTWIDTH] IN BLOOD BY AUTOMATED COUNT: 15.9 % (ref 11.6–15.1)
GFR SERPL CREATININE-BSD FRML MDRD: 95 ML/MIN/1.73SQ M
GLUCOSE SERPL-MCNC: 109 MG/DL (ref 65–140)
HCT VFR BLD AUTO: 39.2 % (ref 36.5–49.3)
HGB BLD-MCNC: 13.2 G/DL (ref 12–17)
IMM GRANULOCYTES # BLD AUTO: 0.02 THOUSAND/UL (ref 0–0.2)
IMM GRANULOCYTES NFR BLD AUTO: 0 % (ref 0–2)
LIPASE SERPL-CCNC: 16 U/L (ref 11–82)
LYMPHOCYTES # BLD AUTO: 3.7 THOUSANDS/ÂΜL (ref 0.6–4.47)
LYMPHOCYTES NFR BLD AUTO: 53 % (ref 14–44)
MAGNESIUM SERPL-MCNC: 1.7 MG/DL (ref 1.9–2.7)
MCH RBC QN AUTO: 26.8 PG (ref 26.8–34.3)
MCHC RBC AUTO-ENTMCNC: 33.7 G/DL (ref 31.4–37.4)
MCV RBC AUTO: 80 FL (ref 82–98)
MONOCYTES # BLD AUTO: 0.43 THOUSAND/ÂΜL (ref 0.17–1.22)
MONOCYTES NFR BLD AUTO: 6 % (ref 4–12)
NEUTROPHILS # BLD AUTO: 2.83 THOUSANDS/ÂΜL (ref 1.85–7.62)
NEUTS SEG NFR BLD AUTO: 40 % (ref 43–75)
NRBC BLD AUTO-RTO: 0 /100 WBCS
P AXIS: 63 DEGREES
P AXIS: 71 DEGREES
PLATELET # BLD AUTO: 342 THOUSANDS/UL (ref 149–390)
PMV BLD AUTO: 10.3 FL (ref 8.9–12.7)
POTASSIUM SERPL-SCNC: 3.6 MMOL/L (ref 3.5–5.3)
PR INTERVAL: 142 MS
PR INTERVAL: 154 MS
PROT SERPL-MCNC: 7.3 G/DL (ref 6.4–8.4)
QRS AXIS: 22 DEGREES
QRS AXIS: 43 DEGREES
QRSD INTERVAL: 80 MS
QRSD INTERVAL: 82 MS
QT INTERVAL: 322 MS
QT INTERVAL: 348 MS
QTC INTERVAL: 464 MS
QTC INTERVAL: 470 MS
RBC # BLD AUTO: 4.93 MILLION/UL (ref 3.88–5.62)
SODIUM SERPL-SCNC: 136 MMOL/L (ref 135–147)
T WAVE AXIS: 53 DEGREES
T WAVE AXIS: 80 DEGREES
VENTRICULAR RATE: 110 BPM
VENTRICULAR RATE: 125 BPM
WBC # BLD AUTO: 7.04 THOUSAND/UL (ref 4.31–10.16)

## 2023-07-05 PROCEDURE — 71275 CT ANGIOGRAPHY CHEST: CPT

## 2023-07-05 PROCEDURE — 83735 ASSAY OF MAGNESIUM: CPT | Performed by: EMERGENCY MEDICINE

## 2023-07-05 PROCEDURE — 36415 COLL VENOUS BLD VENIPUNCTURE: CPT | Performed by: EMERGENCY MEDICINE

## 2023-07-05 PROCEDURE — 83690 ASSAY OF LIPASE: CPT | Performed by: EMERGENCY MEDICINE

## 2023-07-05 PROCEDURE — 93005 ELECTROCARDIOGRAM TRACING: CPT

## 2023-07-05 PROCEDURE — 85379 FIBRIN DEGRADATION QUANT: CPT | Performed by: EMERGENCY MEDICINE

## 2023-07-05 PROCEDURE — 84484 ASSAY OF TROPONIN QUANT: CPT | Performed by: EMERGENCY MEDICINE

## 2023-07-05 PROCEDURE — G1004 CDSM NDSC: HCPCS

## 2023-07-05 PROCEDURE — 85025 COMPLETE CBC W/AUTO DIFF WBC: CPT | Performed by: EMERGENCY MEDICINE

## 2023-07-05 PROCEDURE — 80053 COMPREHEN METABOLIC PANEL: CPT | Performed by: EMERGENCY MEDICINE

## 2023-07-05 PROCEDURE — 93010 ELECTROCARDIOGRAM REPORT: CPT | Performed by: STUDENT IN AN ORGANIZED HEALTH CARE EDUCATION/TRAINING PROGRAM

## 2023-07-05 RX ORDER — MAGNESIUM SULFATE HEPTAHYDRATE 40 MG/ML
2 INJECTION, SOLUTION INTRAVENOUS ONCE
Status: DISCONTINUED | OUTPATIENT
Start: 2023-07-05 | End: 2023-07-05 | Stop reason: SDUPTHER

## 2023-07-05 RX ADMIN — IOHEXOL 100 ML: 350 INJECTION, SOLUTION INTRAVENOUS at 14:39

## 2023-07-05 RX ADMIN — FOLIC ACID: 5 INJECTION, SOLUTION INTRAMUSCULAR; INTRAVENOUS; SUBCUTANEOUS at 13:12

## 2023-07-05 NOTE — DISCHARGE INSTRUCTIONS
Stay well hydrated  Cut back/stop drinking, call [facility] if you change your mind regarding rehab or use resources provided to you    Return to the ER if you have any new or worsening symptoms/concerns including but not limited to lightheadedness, dizziness, passing out, chest pan, difficulty breathing, etc.

## 2023-07-05 NOTE — ED PROVIDER NOTES
History  Chief Complaint   Patient presents with   • Chest Pain     Patient reports that he is intoxicated with 4 days of heavy drinking and feels dehydrated. Patient also reports intermittent chest pain. Pt states, "my heart pops out and then goes back into position after a minute or 2"   • Alcohol Intoxication     45-year-old male history of alcohol abuse presenting for evaluation of chest discomfort/abnormal heartbeat sensation. Patient states that this has been going on for "a long time"/years. He describes intermittent discomfort to left side of his chest and feeling that his heart is beating abnormally. He also states he feels that he bends forward and has sensation of his heart/chest popping out/forward and then it goes back. Denies associated lightheadedness, dizziness, shortness of breath. No known CAD. No history of VTE. He does report recent plane ride to and from [place] about a week ago    Patient also reports chronic alcohol use, drinks 1/5th vodka daily, last drink today. States he has been drinking for the past 4 days straight. Reports he has tried to quit numerous times in the past by going to detox/rehab. He does report wanting to quit, but currently declines detox/rehab because it hasn't worked for him in the past.  Denies history of withdrawal symptoms in the past.    Denies fevers, chills, headache, SOB, cough, abdominal pain, nausea, vomiting, stool, numbness, weakness                 None       Past Medical History:   Diagnosis Date   • Alcoholism /alcohol abuse        History reviewed. No pertinent surgical history. History reviewed. No pertinent family history. I have reviewed and agree with the history as documented.     E-Cigarette/Vaping     E-Cigarette/Vaping Substances     Social History     Tobacco Use   • Smoking status: Every Day     Types: Cigars   • Smokeless tobacco: Never   Substance Use Topics   • Alcohol use: Yes     Comment: 1/5th Smirnoff vodka daily   • Drug use: Yes     Types: Marijuana       Review of Systems    Physical Exam  Physical Exam  Vitals and nursing note reviewed. Constitutional:       General: He is not in acute distress. Appearance: He is well-developed. HENT:      Head: Normocephalic and atraumatic. Nose: Nose normal.   Eyes:      Conjunctiva/sclera: Conjunctivae normal.   Cardiovascular:      Rate and Rhythm: Regular rhythm. Tachycardia present. Heart sounds: Normal heart sounds. Pulmonary:      Effort: Pulmonary effort is normal. No respiratory distress. Breath sounds: Normal breath sounds. No stridor. No wheezing or rales. Chest:      Chest wall: No tenderness. Abdominal:      General: There is no distension. Palpations: Abdomen is soft. Tenderness: There is no abdominal tenderness. There is no guarding or rebound. Musculoskeletal:         General: No deformity. Cervical back: Normal range of motion and neck supple. Right lower leg: No edema. Left lower leg: No edema. Comments: No calf swelling/ttp, no peripheral edema   Skin:     General: Skin is warm and dry. Findings: No rash. Neurological:      General: No focal deficit present. Mental Status: He is alert and oriented to person, place, and time. Cranial Nerves: No cranial nerve deficit. Sensory: No sensory deficit. Motor: No weakness or abnormal muscle tone. Coordination: Coordination normal.      Gait: Gait normal.      Deep Tendon Reflexes: Reflexes normal.   Psychiatric:         Thought Content:  Thought content normal.         Judgment: Judgment normal.         Vital Signs  ED Triage Vitals   Temperature Pulse Respirations Blood Pressure SpO2   07/05/23 1158 07/05/23 1201 07/05/23 1201 07/05/23 1201 07/05/23 1201   98.6 °F (37 °C) (!) 134 18 119/67 100 %      Temp Source Heart Rate Source Patient Position - Orthostatic VS BP Location FiO2 (%)   07/05/23 1158 07/05/23 1201 07/05/23 1201 07/05/23 1201 -- Oral Monitor Sitting Right arm       Pain Score       --                  Vitals:    07/05/23 1201 07/05/23 1338 07/05/23 1611   BP: 119/67 134/79 156/90   Pulse: (!) 134 97 105   Patient Position - Orthostatic VS: Sitting Lying Lying         Visual Acuity  Visual Acuity    Flowsheet Row Most Recent Value   L Pupil Size (mm) 3   R Pupil Size (mm) 3          ED Medications  Medications   thiamine (VITAMIN B1) 159 mg, folic acid 1 mg, magnesium sulfate 2 g in sodium chloride 0.9 % 1,000 mL IVPB ( Intravenous Stopped 7/5/23 1412)   iohexol (OMNIPAQUE) 350 MG/ML injection (SINGLE-DOSE) 100 mL (100 mL Intravenous Given 7/5/23 1439)       Diagnostic Studies  Results Reviewed     Procedure Component Value Units Date/Time    HS Troponin I 2hr [[id number]]  (Normal) Collected: 07/05/23 1518    Lab Status: Final result Specimen: Blood from Arm, Left Updated: 07/05/23 1553     hs TnI 2hr 5 ng/L      Delta 2hr hsTnI -1 ng/L     D-dimer, quantitative [[id number]]  (Abnormal) Collected: 07/05/23 1309    Lab Status: Final result Specimen: Blood from Arm, Left Updated: 07/05/23 1340     D-Dimer, Quant 0.83 ug/ml FEU     Narrative: In the evaluation for possible pulmonary embolism, in the appropriate (Well's Score of 4 or less) patient, the age adjusted d-dimer cutoff for this patient can be calculated as:    Age x 0.01 (in ug/mL) for Age-adjusted D-dimer exclusion threshold for a patient over 50 years.     HS Troponin 0hr (reflex protocol) [[id number]]  (Normal) Collected: 07/05/23 1309    Lab Status: Final result Specimen: Blood from Arm, Left Updated: 07/05/23 1339     hs TnI 0hr 6 ng/L     Comprehensive metabolic panel [[id number]]  (Abnormal) Collected: 07/05/23 1309    Lab Status: Final result Specimen: Blood from Arm, Left Updated: 07/05/23 1331     Sodium 136 mmol/L      Potassium 3.6 mmol/L      Chloride 102 mmol/L      CO2 20 mmol/L      ANION GAP 14 mmol/L      BUN 10 mg/dL      Creatinine 0.79 mg/dL      Glucose 109 mg/dL Calcium 8.6 mg/dL      AST 32 U/L      ALT 24 U/L      Alkaline Phosphatase 105 U/L      Total Protein 7.3 g/dL      Albumin 4.0 g/dL      Total Bilirubin 0.55 mg/dL      eGFR 95 ml/min/1.73sq m     Narrative:      [facility] (CKD):   •  Stage 1 with normal or high GFR (GFR > 90 mL/min/1.73 square meters)  •  Stage 2 Mild CKD (GFR = 60-89 mL/min/1.73 square meters)  •  Stage 3A Moderate CKD (GFR = 45-59 mL/min/1.73 square meters)  •  Stage 3B Moderate CKD (GFR = 30-44 mL/min/1.73 square meters)  •  Stage 4 Severe CKD (GFR = 15-29 mL/min/1.73 square meters)  •  Stage 5 End Stage CKD (GFR <15 mL/min/1.73 square meters)  Note: GFR calculation is accurate only with a steady state creatinine    Lipase [[id number]]  (Normal) Collected: 07/05/23 1309    Lab Status: Final result Specimen: Blood from Arm, Left Updated: 07/05/23 1331     Lipase 16 u/L     Magnesium [[id number]]  (Abnormal) Collected: 07/05/23 1309    Lab Status: Final result Specimen: Blood from Arm, Left Updated: 07/05/23 1331     Magnesium 1.7 mg/dL     CBC and differential [[id number]]  (Abnormal) Collected: 07/05/23 1309    Lab Status: Final result Specimen: Blood from Arm, Left Updated: 07/05/23 1316     WBC 7.04 Thousand/uL      RBC 4.93 Million/uL      Hemoglobin 13.2 g/dL      Hematocrit 39.2 %      MCV 80 fL      MCH 26.8 pg      MCHC 33.7 g/dL      RDW 15.9 %      MPV 10.3 fL      Platelets 425 Thousands/uL      nRBC 0 /100 WBCs      Neutrophils Relative 40 %      Immat GRANS % 0 %      Lymphocytes Relative 53 %      Monocytes Relative 6 %      Eosinophils Relative 0 %      Basophils Relative 1 %      Neutrophils Absolute 2.83 Thousands/µL      Immature Grans Absolute 0.02 Thousand/uL      Lymphocytes Absolute 3.70 Thousands/µL      Monocytes Absolute 0.43 Thousand/µL      Eosinophils Absolute 0.02 Thousand/µL      Basophils Absolute 0.04 Thousands/µL                  CTA ED chest PE Study ED Interpretation by Joni Choudhury DO (07/05 1540)   IMPRESSION:     No pulmonary embolus.     No acute pulmonary disease.     Diffuse hepatic steatosis.     Cholelithiasis.         Final Result by Daniel Sierra MD (07/05 1519)      No pulmonary embolus. No acute pulmonary disease. Diffuse hepatic steatosis. Cholelithiasis. Workstation performed: [id number]                    Procedures  Procedures         ED Course  ED Course as of 07/05/23 2145   Wed Jul 05, 2023   1241 EKG independently interpreted by myself:  sinus tachycardia @ 125 bpm, normal axis, normal intervals, qtc 464, no sig st/t wave changes   1259 Pulse(!): 134  Tachycardic, likely 2/2 dehydration   1342 D-Dimer, Quant(!): 0.83  Will get CTA   1343 Magnesium(!): 1.7   1343 hs TnI 0hr: 6   1343 Pulse: 97  HR improved   1457 CTA independently interpreted by myself: no large PE, pending rads read   1551 Pt reassessed states feeling better, reviewed all results, pending delta troponin.  I offered detox/rehab again and he declines   1554 Delta 2hr hsTnI: -1             HEART Risk Score    Flowsheet Row Most Recent Value   Heart Score Risk Calculator    History 0 Filed at: 07/05/2023 1343   ECG 0 Filed at: 07/05/2023 1343   Age 1 Filed at: 07/05/2023 1343   Risk Factors 1 Filed at: 07/05/2023 1343   Troponin 0 Filed at: 07/05/2023 1343   HEART Score 2 Filed at: 07/05/2023 1343                            Wells' Criteria for PE    Flowsheet Row Most Recent Value   Wells' Criteria for PE    Clinical signs and symptoms of DVT 0 Filed at: 07/05/2023 1343   PE is primary diagnosis or equally likely 0 Filed at: 07/05/2023 1343   HR >100 1.5 Filed at: 07/05/2023 1343   Immobilization at least 3 days or Surgery in the previous 4 weeks 0 Filed at: 07/05/2023 1343   Previous, objectively diagnosed PE or DVT 0 Filed at: 07/05/2023 1343   Hemoptysis 0 Filed at: 07/05/2023 1343   Malignancy with treatment within 6 months or palliative 0 Filed at: 07/05/2023 1343   Wells' Criteria Total 1.5 Filed at: 07/05/2023 1343                Medical Decision Making  77-year-old male presenting with intermittent chest pain/abnormal heartbeat sensation- EKG to r/o STEMI/dysrhythmia/abn intervals/ischemic changes, troponin to eval for ischemia, CBC to assess for leukocytosis/anemia, CMP to assess for elevated LFTs/LORE/electrolyte derangements, ddimer to r/o PE    Alcohol abuse- IVF/thiamine/folic acid/mag-Labs to assess for metabolic derangement/LORE/alcoholic ketoacidosis    Sx improved in ER. Ddimer was elevated, CTA negative for PE. Discussed incidental findings of cholelithiasis/hepatic steatosis. Instructed to f/u with PCP and Cardiology    Pt again declines rehab/detox, provided list of outpt resources and [facility] phone number    Close return precautions reviewed and pt expressed understanding with plan    Atypical chest pain: acute illness or injury  Cholelithiasis: chronic illness or injury  Chronic alcohol abuse: chronic illness or injury  Hepatic steatosis: chronic illness or injury  Hypomagnesemia: acute illness or injury  Amount and/or Complexity of Data Reviewed  External Data Reviewed: labs, radiology, ECG and notes. Labs: ordered. Decision-making details documented in ED Course. Radiology: ordered and independent interpretation performed. Decision-making details documented in ED Course. ECG/medicine tests: ordered and independent interpretation performed. Decision-making details documented in ED Course. Risk  Prescription drug management.           Disposition  Final diagnoses:   Atypical chest pain   Chronic alcohol abuse   Hypomagnesemia   Hepatic steatosis   Cholelithiasis     Time reflects when diagnosis was documented in both MDM as applicable and the Disposition within this note     Time User Action Codes Description Comment    7/5/2023  3:53 PM Yolanda HAMMONDS Add [R07.89] Atypical chest pain     7/5/2023  3:54 PM [facility] Add [F10.10] Chronic alcohol abuse     7/5/2023  3:59 PM Babs Morse Add [E83.42] Hypomagnesemia     7/5/2023  3:59 PM Babs Morse Add [K76.0] Hepatic steatosis     7/5/2023  3:59 PM Alvy Morse Add [K80.20] Cholelithiasis       ED Disposition     ED Disposition   Discharge    Condition   Stable    Date/Time   Wed Jul 5, 2023  3:56 PM    [address] discharge to home/self care. Follow-up Information     Follow up With Specialties Details Why Contact Info Additional Information        Juancarlos Quach     PCP-[employer] ([facility]) - Nurse Practitioner    [phone]     [facility]   If you change your mind regarding rehab [id number] [facility]   [address] [place] [id number]  [address], [id number] [phone]          There are no discharge medications for this patient. No discharge procedures on file.     PDMP Review     None          ED Provider  Electronically Signed by           Han Gonzalez DO  07/05/23 0727

## 2023-12-31 ENCOUNTER — HOSPITAL ENCOUNTER (EMERGENCY)
Facility: HOSPITAL | Age: 63
Discharge: HOME | End: 2023-12-31
Attending: EMERGENCY MEDICINE
Payer: COMMERCIAL

## 2023-12-31 ENCOUNTER — APPOINTMENT (EMERGENCY)
Dept: RADIOLOGY | Facility: HOSPITAL | Age: 63
End: 2023-12-31
Payer: COMMERCIAL

## 2023-12-31 VITALS
SYSTOLIC BLOOD PRESSURE: 127 MMHG | TEMPERATURE: 98.2 F | OXYGEN SATURATION: 95 % | RESPIRATION RATE: 20 BRPM | HEART RATE: 115 BPM | DIASTOLIC BLOOD PRESSURE: 81 MMHG

## 2023-12-31 DIAGNOSIS — R00.2 PALPITATIONS: Primary | ICD-10-CM

## 2023-12-31 DIAGNOSIS — F10.90 ALCOHOL USE DISORDER: ICD-10-CM

## 2023-12-31 LAB
2HR DELTA HS TROPONIN: 2 NG/L
ALBUMIN SERPL BCP-MCNC: 4.3 G/DL (ref 3.5–5)
ALP SERPL-CCNC: 111 U/L (ref 34–104)
ALT SERPL W P-5'-P-CCNC: 19 U/L (ref 7–52)
ANION GAP SERPL CALCULATED.3IONS-SCNC: 18 MMOL/L
AST SERPL W P-5'-P-CCNC: 30 U/L (ref 13–39)
ATRIAL RATE: 116 BPM
ATRIAL RATE: 123 BPM
BASOPHILS # BLD AUTO: 0.04 THOUSANDS/ÂΜL (ref 0–0.1)
BASOPHILS NFR BLD AUTO: 0 % (ref 0–1)
BILIRUB SERPL-MCNC: 0.53 MG/DL (ref 0.2–1)
BUN SERPL-MCNC: 9 MG/DL (ref 5–25)
CALCIUM SERPL-MCNC: 8.9 MG/DL (ref 8.4–10.2)
CARDIAC TROPONIN I PNL SERPL HS: 7 NG/L
CARDIAC TROPONIN I PNL SERPL HS: 9 NG/L
CHLORIDE SERPL-SCNC: 99 MMOL/L (ref 96–108)
CO2 SERPL-SCNC: 19 MMOL/L (ref 21–32)
CREAT SERPL-MCNC: 0.77 MG/DL (ref 0.6–1.3)
EOSINOPHIL # BLD AUTO: 0.01 THOUSAND/ÂΜL (ref 0–0.61)
EOSINOPHIL NFR BLD AUTO: 0 % (ref 0–6)
ERYTHROCYTE [DISTWIDTH] IN BLOOD BY AUTOMATED COUNT: 16.9 % (ref 11.6–15.1)
GFR SERPL CREATININE-BSD FRML MDRD: 96 ML/MIN/1.73SQ M
GLUCOSE SERPL-MCNC: 135 MG/DL (ref 65–140)
HCT VFR BLD AUTO: 38.6 % (ref 36.5–49.3)
HGB BLD-MCNC: 12.5 G/DL (ref 12–17)
IMM GRANULOCYTES # BLD AUTO: 0.02 THOUSAND/UL (ref 0–0.2)
IMM GRANULOCYTES NFR BLD AUTO: 0 % (ref 0–2)
LYMPHOCYTES # BLD AUTO: 5.09 THOUSANDS/ÂΜL (ref 0.6–4.47)
LYMPHOCYTES NFR BLD AUTO: 54 % (ref 14–44)
MAGNESIUM SERPL-MCNC: 2 MG/DL (ref 1.9–2.7)
MCH RBC QN AUTO: 24.6 PG (ref 26.8–34.3)
MCHC RBC AUTO-ENTMCNC: 32.4 G/DL (ref 31.4–37.4)
MCV RBC AUTO: 76 FL (ref 82–98)
MONOCYTES # BLD AUTO: 0.9 THOUSAND/ÂΜL (ref 0.17–1.22)
MONOCYTES NFR BLD AUTO: 9 % (ref 4–12)
NEUTROPHILS # BLD AUTO: 3.51 THOUSANDS/ÂΜL (ref 1.85–7.62)
NEUTS SEG NFR BLD AUTO: 37 % (ref 43–75)
NRBC BLD AUTO-RTO: 0 /100 WBCS
P AXIS: 48 DEGREES
P AXIS: 55 DEGREES
PLATELET # BLD AUTO: 335 THOUSANDS/UL (ref 149–390)
PMV BLD AUTO: 10.4 FL (ref 8.9–12.7)
POTASSIUM SERPL-SCNC: 3.1 MMOL/L (ref 3.5–5.3)
PR INTERVAL: 144 MS
PR INTERVAL: 150 MS
PROT SERPL-MCNC: 7.4 G/DL (ref 6.4–8.4)
QRS AXIS: -2 DEGREES
QRS AXIS: -5 DEGREES
QRSD INTERVAL: 88 MS
QRSD INTERVAL: 88 MS
QT INTERVAL: 320 MS
QT INTERVAL: 338 MS
QTC INTERVAL: 458 MS
QTC INTERVAL: 469 MS
RBC # BLD AUTO: 5.09 MILLION/UL (ref 3.88–5.62)
SODIUM SERPL-SCNC: 136 MMOL/L (ref 135–147)
T WAVE AXIS: 36 DEGREES
T WAVE AXIS: 68 DEGREES
VENTRICULAR RATE: 116 BPM
VENTRICULAR RATE: 123 BPM
WBC # BLD AUTO: 9.57 THOUSAND/UL (ref 4.31–10.16)

## 2023-12-31 PROCEDURE — 99285 EMERGENCY DEPT VISIT HI MDM: CPT | Performed by: EMERGENCY MEDICINE

## 2023-12-31 PROCEDURE — 85025 COMPLETE CBC W/AUTO DIFF WBC: CPT | Performed by: EMERGENCY MEDICINE

## 2023-12-31 PROCEDURE — 93005 ELECTROCARDIOGRAM TRACING: CPT

## 2023-12-31 PROCEDURE — 71045 X-RAY EXAM CHEST 1 VIEW: CPT

## 2023-12-31 PROCEDURE — 96365 THER/PROPH/DIAG IV INF INIT: CPT

## 2023-12-31 PROCEDURE — 83735 ASSAY OF MAGNESIUM: CPT | Performed by: EMERGENCY MEDICINE

## 2023-12-31 PROCEDURE — 80053 COMPREHEN METABOLIC PANEL: CPT | Performed by: EMERGENCY MEDICINE

## 2023-12-31 PROCEDURE — 96366 THER/PROPH/DIAG IV INF ADDON: CPT

## 2023-12-31 PROCEDURE — 36415 COLL VENOUS BLD VENIPUNCTURE: CPT | Performed by: EMERGENCY MEDICINE

## 2023-12-31 PROCEDURE — 99285 EMERGENCY DEPT VISIT HI MDM: CPT

## 2023-12-31 PROCEDURE — 84484 ASSAY OF TROPONIN QUANT: CPT | Performed by: EMERGENCY MEDICINE

## 2023-12-31 RX ORDER — POTASSIUM CHLORIDE 20 MEQ/1
40 TABLET, EXTENDED RELEASE ORAL ONCE
Status: COMPLETED | OUTPATIENT
Start: 2023-12-31 | End: 2023-12-31

## 2023-12-31 RX ADMIN — POTASSIUM CHLORIDE 40 MEQ: 1500 TABLET, EXTENDED RELEASE ORAL at 10:10

## 2023-12-31 RX ADMIN — MAGNESIUM SULFATE HEPTAHYDRATE: 500 INJECTION, SOLUTION INTRAMUSCULAR; INTRAVENOUS at 09:36

## 2023-12-31 NOTE — ED PROVIDER NOTES
"History  Chief Complaint   Patient presents with    Medical Problem     Patient has been drinking all night with last drink around 30 min ago and claims to be an alcoholic. Patient c/o heart popping out of his chest and then it resetting itself. This has happened before.     63-year-old male with history of alcoholism presents to the ED complaining of feeling like his heart is coming out of his chest.  He states he drank a lot of alcohol last evening including vodka and \"many margaritas\".  He states at times he feels like his heart is coming out of his chest but not necessarily feeling like it is beating fast.  He denies any chest pain.  Shortness of breath.  No known sequela of his alcoholism.  He states after a while his heart \"settles down\".  He states this has happened before with his drinking.  He does not want inpatient treatment at this time.      History provided by:  Patient   used: No    Palpitations  Palpitations quality:  Regular  Onset quality:  Sudden (After drinking large quantities of alcohol)  Duration:  4 hours  Timing:  Intermittent  Progression:  Unchanged  Chronicity:  Recurrent  Context comment:  Alcohol consumption  Relieved by:  None tried  Worsened by:  Alcohol  Ineffective treatments:  None tried  Associated symptoms: no back pain, no chest pain, no chest pressure, no cough, no diaphoresis, no dizziness, no leg pain, no lower extremity edema, no malaise/fatigue, no nausea, no near-syncope, no numbness, no orthopnea, no PND, no shortness of breath, no syncope, no vomiting and no weakness    Risk factors: no diabetes mellitus, no heart disease, no hx of atrial fibrillation, no hx of DVT, no hx of PE, no hx of thyroid disease, no hypercoagulable state, no hyperthyroidism, no OTC sinus medications and no stress        None       Past Medical History:   Diagnosis Date    Alcoholism /alcohol abuse        History reviewed. No pertinent surgical history.    History reviewed. No " pertinent family history.  I have reviewed and agree with the history as documented.    E-Cigarette/Vaping    E-Cigarette Use Never User      E-Cigarette/Vaping Substances     Social History     Tobacco Use    Smoking status: Every Day     Types: Cigars    Smokeless tobacco: Never   Vaping Use    Vaping status: Never Used   Substance Use Topics    Alcohol use: Yes     Comment: 1/5th Hardik zaragoza daily    Drug use: Yes     Types: Marijuana       Review of Systems   Constitutional: Negative.  Negative for activity change, appetite change, chills, diaphoresis, fatigue, fever and malaise/fatigue.   HENT: Negative.     Eyes: Negative.    Respiratory: Negative.  Negative for cough and shortness of breath.    Cardiovascular:  Positive for palpitations. Negative for chest pain, orthopnea, leg swelling, syncope, PND and near-syncope.   Gastrointestinal: Negative.  Negative for nausea and vomiting.   Genitourinary: Negative.    Musculoskeletal:  Negative for back pain and neck pain.   Skin: Negative.    Allergic/Immunologic: Negative.    Neurological: Negative.  Negative for dizziness, weakness, numbness and headaches.   Hematological: Negative.    Psychiatric/Behavioral: Negative.     All other systems reviewed and are negative.      Physical Exam  Physical Exam  Vitals and nursing note reviewed.   Constitutional:       General: He is awake. He is not in acute distress.     Appearance: Normal appearance. He is well-developed and overweight. He is not ill-appearing, toxic-appearing or diaphoretic.      Comments: Smells of alcohol   HENT:      Head: Normocephalic and atraumatic.      Right Ear: External ear normal.      Left Ear: External ear normal.      Nose: Nose normal.      Mouth/Throat:      Mouth: Mucous membranes are moist.   Eyes:      General: No scleral icterus.     Conjunctiva/sclera: Conjunctivae normal.      Pupils: Pupils are equal, round, and reactive to light.   Neck:      Thyroid: No thyromegaly.       Vascular: No JVD.   Cardiovascular:      Rate and Rhythm: Regular rhythm. Tachycardia present. Occasional Extrasystoles are present.     Heart sounds: Normal heart sounds. No murmur heard.     No gallop.   Pulmonary:      Effort: Pulmonary effort is normal. No respiratory distress.      Breath sounds: Normal breath sounds. No stridor. No wheezing, rhonchi or rales.   Abdominal:      General: Bowel sounds are normal. There is no distension.      Palpations: Abdomen is soft. There is no mass.      Tenderness: There is no abdominal tenderness.      Hernia: No hernia is present.   Musculoskeletal:      Right lower leg: No edema.      Left lower leg: No edema.   Skin:     General: Skin is warm and dry.      Coloration: Skin is not jaundiced or pale.      Findings: No bruising, erythema, lesion or rash.   Neurological:      General: No focal deficit present.      Mental Status: He is alert and oriented to person, place, and time.      Motor: No weakness.      Deep Tendon Reflexes: Reflexes are normal and symmetric.   Psychiatric:         Mood and Affect: Mood normal.         Behavior: Behavior is cooperative.         Vital Signs  ED Triage Vitals [12/31/23 0836]   Temperature Pulse Respirations Blood Pressure SpO2   98.2 °F (36.8 °C) (!) 130 18 150/92 99 %      Temp Source Heart Rate Source Patient Position - Orthostatic VS BP Location FiO2 (%)   Oral Monitor Lying Right arm --      Pain Score       No Pain           Vitals:    12/31/23 0915 12/31/23 0942 12/31/23 1130 12/31/23 1334   BP:  147/95 131/86 127/81   Pulse: (!) 108 (!) 107 (!) 115 (!) 115   Patient Position - Orthostatic VS:  Lying Lying Lying         Visual Acuity      ED Medications  Medications   magnesium sulfate 2 g, thiamine (VITAMIN B1) 100 mg, folic acid 1 mg in sodium chloride 0.9 % 1,000 mL infusion ( Intravenous Stopped 12/31/23 1334)   potassium chloride (K-DUR,KLOR-CON) CR tablet 40 mEq (40 mEq Oral Given 12/31/23 1010)       Diagnostic  Studies  Results Reviewed       Procedure Component Value Units Date/Time    HS Troponin I 2hr [[id number]]  (Normal) Collected: 12/31/23 1128    Lab Status: Final result Specimen: Blood from Arm, Left Updated: 12/31/23 1158     hs TnI 2hr 9 ng/L      Delta 2hr hsTnI 2 ng/L     HS Troponin 0hr (reflex protocol) [[id number]]  (Normal) Collected: 12/31/23 0912    Lab Status: Final result Specimen: Blood from Arm, Left Updated: 12/31/23 0939     hs TnI 0hr 7 ng/L     Comprehensive metabolic panel [[id number]]  (Abnormal) Collected: 12/31/23 0912    Lab Status: Final result Specimen: Blood from Arm, Left Updated: 12/31/23 0932     Sodium 136 mmol/L      Potassium 3.1 mmol/L      Chloride 99 mmol/L      CO2 19 mmol/L      ANION GAP 18 mmol/L      BUN 9 mg/dL      Creatinine 0.77 mg/dL      Glucose 135 mg/dL      Calcium 8.9 mg/dL      AST 30 U/L      ALT 19 U/L      Alkaline Phosphatase 111 U/L      Total Protein 7.4 g/dL      Albumin 4.3 g/dL      Total Bilirubin 0.53 mg/dL      eGFR 96 ml/min/1.73sq m     Narrative:      National Kid[employer] Foundation guidelines for Chronic Kidney Disease (CKD):     Stage 1 with normal or high GFR (GFR > 90 mL/min/1.73 square meters)    Stage 2 Mild CKD (GFR = 60-89 mL/min/1.73 square meters)    Stage 3A Moderate CKD (GFR = 45-59 mL/min/1.73 square meters)    Stage 3B Moderate CKD (GFR = 30-44 mL/min/1.73 square meters)    Stage 4 Severe CKD (GFR = 15-29 mL/min/1.73 square meters)    Stage 5 End Stage CKD (GFR <15 mL/min/1.73 square meters)  Note: GFR calculation is accurate only with a steady state creatinine    Magnesium [[id number]]  (Normal) Collected: 12/31/23 0912    Lab Status: Final result Specimen: Blood from Arm, Left Updated: 12/31/23 0932     Magnesium 2.0 mg/dL     CBC and differential [[id number]]  (Abnormal) Collected: 12/31/23 0912    Lab Status: Final result Specimen: Blood from Arm, Left Updated: 12/31/23 0918     WBC 9.57 Thousand/uL      RBC 5.09 Million/uL       Hemoglobin 12.5 g/dL      Hematocrit 38.6 %      MCV 76 fL      MCH 24.6 pg      MCHC 32.4 g/dL      RDW 16.9 %      MPV 10.4 fL      Platelets 335 Thousands/uL      nRBC 0 /100 WBCs      Neutrophils Relative 37 %      Immat GRANS % 0 %      Lymphocytes Relative 54 %      Monocytes Relative 9 %      Eosinophils Relative 0 %      Basophils Relative 0 %      Neutrophils Absolute 3.51 Thousands/µL      Immature Grans Absolute 0.02 Thousand/uL      Lymphocytes Absolute 5.09 Thousands/µL      Monocytes Absolute 0.90 Thousand/µL      Eosinophils Absolute 0.01 Thousand/µL      Basophils Absolute 0.04 Thousands/µL                    XR chest 1 view portable   ED Interpretation by Alena Anderson DO (12/31 0931)   Cardiomegaly.  Clear lungs                 Procedures  ECG 12 Lead Documentation Only    Date/Time: 12/31/2023 9:12 AM    Performed by: Alena Anderson DO  Authorized by: Alena Anderson DO    Indications / Diagnosis:  Palpitations  ECG reviewed by me, the ED Provider: yes    Patient location:  ED  Interpretation:     Interpretation: abnormal    Rate:     ECG rate:  123    ECG rate assessment: tachycardic    Rhythm:     Rhythm: sinus tachycardia    Ectopy:     Ectopy: PVCs      PVCs:  Infrequent  Conduction:     Conduction: normal    ST segments:     ST segments:  Normal  T waves:     T waves: normal             ED Course  ED Course as of 12/31/23 1346   Sun Dec 31, 2023   0930 WBC: 9.57   0930 Hemoglobin: 12.5   0930 Chest x-ray: Cardiomegaly.  Clear lungs   0953 Magnesium: 2.0   0953 hs TnI 0hr: 7   0953 Potassium(!): 3.1   1222 hs TnI 2hr: 9   1222 Delta 2hr hsTnI: 2   1224 Patient remains mildly tachycardic.  He has been tachycardic on past visits usually for alcohol intoxication.  He was seen here in July and had a D-dimer followed by CTA of chest to rule out PE secondary to the tachycardia and that was negative.  Suspect tachycardia now may be due to partial dehydration or may be alcohol  withdrawal.  Very low clinical suspicion for pulmonary embolus             HEART Risk Score      Flowsheet Row Most Recent Value   Heart Score Risk Calculator    History 0 Filed at: 12/31/2023 1014   ECG 0 Filed at: 12/31/2023 1014   Age 1 Filed at: 12/31/2023 1014   Risk Factors 1 Filed at: 12/31/2023 1014   Troponin 0 Filed at: 12/31/2023 1014   HEART Score 2 Filed at: 12/31/2023 1014                          SBIRT 22yo+      Flowsheet Row Most Recent Value   Initial Alcohol Screen: US AUDIT-C     1. How often do you have a drink containing alcohol? 6 Filed at: 12/31/2023 0844   2. How many drinks containing alcohol do you have on a typical day you are drinking?  5 Filed at: 12/31/2023 0844   3a. Male UNDER 65: How often do you have five or more drinks on one occasion? 6 Filed at: 12/31/2023 0844   Audit-C Score 17 Filed at: 12/31/2023 0844   Full Alcohol Screen: US AUDIT    4. How often during the last year have you found that you were not able to stop drinking once you had started? 4 Filed at: 12/31/2023 0844   5. How often during past year have you failed to do what was normally expected of you because of drinking?  0 Filed at: 12/31/2023 0844   6. How often in past year have you needed a first drink in the morning to get yourself going after a heavy drinking session?  4 Filed at: 12/31/2023 0844   7. How often in past year have you had feeling of guilt or remorse after drinking?  4 Filed at: 12/31/2023 0844   8. How often in past year have you been unable to remember what happened night before because you had been drinking?  0 Filed at: 12/31/2023 0844   9. Have you or someone else been injured as a result of your drinking?  0 Filed at: 12/31/2023 0844   10. Has a relative, friend, doctor or other health worker been concerned about your drinking and suggested you cut down?  4 Filed at: 12/31/2023 0844   AUDIT Total Score 33 Filed at: 12/31/2023 0844   GINGER: How many times in the past year have you...    Used  "an illegal drug or used a prescription medication for non-medical reasons? Never Filed at: 12/31/2023 0844                      Medical Decision Making  63-year-old male with history of alcoholism presents to the emergency department with feeling like his heart is \"coming out of my chest\".  He states this happens sometimes after he drinks large quantities of alcohol which he did last night.  He denies any chest pain or shortness of breath.  He has no known diagnosis of ACS or atrial fibrillation.  No sequela of his alcoholism.  On exam he is alert in no acute distress but does have alcohol like odor on his breath.  He is mildly tachycardic on exam with occasional extrasystoles which appear to be PVCs on the monitor.  His lungs are clear.  Will do cardiac workup but low clinical suspicion for ACS.  Suspect the large alcohol consumption last evening may be causing his PVCs and possibly runs of atrial fibrillation.  Will continue to keep patient on the monitor.  I did offer alcohol rehab services, but the patient declined stating he has been through rehab many times.  Will still place consult for  as he may change his mind or want outpatient referrals.    Amount and/or Complexity of Data Reviewed  Labs: ordered. Decision-making details documented in ED Course.  Radiology: ordered and independent interpretation performed.  ECG/medicine tests: ordered and independent interpretation performed.     Details: Sinus tachycardia rate 123.  Occasional PVC.  Normal conduction.  Normal ST-T waves.  No ischemia.  No STEMI.    Risk  Prescription drug management.             Disposition  Final diagnoses:   Palpitations   Alcohol use disorder     Time reflects when diagnosis was documented in both MDM as applicable and the Disposition within this note       Time User Action Codes Description Comment    12/31/2023  1:45 PM Alena Anderson Add [R00.2] Palpitations     12/31/2023  1:45 PM Alena Anderson Add " [F10.90] Alcohol use disorder           ED Disposition       ED Disposition   Discharge    Condition   Good    Date/Time   Sun Dec 31, 2023 1345    Comment   Juan Luis Cloud discharge to home/self care.                   Follow-up Information       Follow up With Specialties Details Why Contact Info Additional Information    [facility] Schedule an appointment as soon as possible for a visit in 2 days  [address] [facility] [address] [phone] [facility] [address] [place], [place], [address] [phone]            Patient's Medications    No medications on file       No discharge procedures on file.    PDMP Review       None            ED Provider  Electronically Signed by             Alena Anderson, DO  12/31/23 0913       Alena Anderson, DO  12/31/23 0553

## 2023-12-31 NOTE — ED NOTES
"Pt stated he felt his heart \"pop out of place\" while moving to sit up. EKG obtained and MD notified.      Claudia Grossman RN  12/31/23 6211    "

## 2024-01-01 LAB
ATRIAL RATE: 111 BPM
P AXIS: 57 DEGREES
PR INTERVAL: 154 MS
QRS AXIS: -4 DEGREES
QRSD INTERVAL: 84 MS
QT INTERVAL: 336 MS
QTC INTERVAL: 456 MS
T WAVE AXIS: 43 DEGREES
VENTRICULAR RATE: 111 BPM